# Patient Record
Sex: MALE | Race: OTHER | Employment: UNEMPLOYED | URBAN - METROPOLITAN AREA
[De-identification: names, ages, dates, MRNs, and addresses within clinical notes are randomized per-mention and may not be internally consistent; named-entity substitution may affect disease eponyms.]

---

## 2022-05-05 ENCOUNTER — APPOINTMENT (OUTPATIENT)
Dept: GENERAL RADIOLOGY | Age: 42
End: 2022-05-05
Attending: EMERGENCY MEDICINE

## 2022-05-05 ENCOUNTER — HOSPITAL ENCOUNTER (EMERGENCY)
Age: 42
Discharge: HOME | End: 2022-05-05
Attending: EMERGENCY MEDICINE

## 2022-05-05 VITALS
RESPIRATION RATE: 16 BRPM | BODY MASS INDEX: 23.78 KG/M2 | SYSTOLIC BLOOD PRESSURE: 122 MMHG | HEIGHT: 66 IN | HEART RATE: 57 BPM | DIASTOLIC BLOOD PRESSURE: 78 MMHG | TEMPERATURE: 97.5 F | OXYGEN SATURATION: 98 % | WEIGHT: 148 LBS

## 2022-05-05 DIAGNOSIS — M54.6 BILATERAL THORACIC BACK PAIN, UNSPECIFIED CHRONICITY: Primary | ICD-10-CM

## 2022-05-05 PROCEDURE — 74011250637 HC RX REV CODE- 250/637: Performed by: EMERGENCY MEDICINE

## 2022-05-05 PROCEDURE — 96372 THER/PROPH/DIAG INJ SC/IM: CPT

## 2022-05-05 PROCEDURE — 99284 EMERGENCY DEPT VISIT MOD MDM: CPT

## 2022-05-05 PROCEDURE — 71046 X-RAY EXAM CHEST 2 VIEWS: CPT

## 2022-05-05 PROCEDURE — 74011250636 HC RX REV CODE- 250/636: Performed by: EMERGENCY MEDICINE

## 2022-05-05 RX ORDER — KETOROLAC TROMETHAMINE 30 MG/ML
30 INJECTION, SOLUTION INTRAMUSCULAR; INTRAVENOUS
Status: COMPLETED | OUTPATIENT
Start: 2022-05-05 | End: 2022-05-05

## 2022-05-05 RX ORDER — IBUPROFEN 600 MG/1
600 TABLET ORAL
Qty: 20 TABLET | Refills: 0 | Status: SHIPPED | OUTPATIENT
Start: 2022-05-05

## 2022-05-05 RX ORDER — CYCLOBENZAPRINE HCL 10 MG
10 TABLET ORAL
Status: COMPLETED | OUTPATIENT
Start: 2022-05-05 | End: 2022-05-05

## 2022-05-05 RX ORDER — CYCLOBENZAPRINE HCL 10 MG
10 TABLET ORAL
Qty: 12 TABLET | Refills: 0 | Status: SHIPPED | OUTPATIENT
Start: 2022-05-05 | End: 2022-05-20

## 2022-05-05 RX ADMIN — CYCLOBENZAPRINE HYDROCHLORIDE 10 MG: 10 TABLET, FILM COATED ORAL at 14:07

## 2022-05-05 RX ADMIN — KETOROLAC TROMETHAMINE 30 MG: 30 INJECTION, SOLUTION INTRAMUSCULAR; INTRAVENOUS at 14:07

## 2022-05-05 NOTE — Clinical Note
[facility] [place] EMERGENCY DEPT  [address] [id number] [phone]    Work/School Note    Date: 2022    To Whom It May concern:      Felicia Prater was seen and treated today in the emergency room by the following provider(s):  Attending Provider: Kornad Dumont MD.      Felicia Prater is excused from work/school on 22. He is clear to return to work/school on 22.         Sincerely,          Sue Apgar, MD

## 2022-05-05 NOTE — ED TRIAGE NOTES
Pt reports mid back pain. Pt denies injury or trauma. Pt reports taking tylenol w/o relief. Pt also complaining of right foot pain. Pt denies injury or trauma.

## 2022-05-05 NOTE — ED NOTES
Discharge instructions were given to the patient by Lisseth Castrejon RN. The patient left the Emergency Department ambulatory, alert and oriented and in no acute distress with 2 prescriptions. The patient was encouraged to call or return to the ED for worsening issues or problems and was encouraged to schedule a follow up appointment for continuing care. The patient verbalized understanding of discharge instructions and prescriptions, all questions were answered. The patient has no further concerns at this time.

## 2022-05-05 NOTE — Clinical Note
[facility] EMERGENCY DEPT  [address] [id number] [phone]    Work/School Note    Date: 5/5/2022    To Whom It May concern:      Sangeeta De Dios was seen and treated today in the emergency room by the following provider(s):  Attending Provider: Roseline Dyson MD.      Sangeeta De Dios is excused from work/school on 05/05/22. He is clear to return to work/school on 05/06/22.         Sincerely,          Julia De La Cruz MD

## 2022-05-05 NOTE — ED PROVIDER NOTES
EMERGENCY DEPARTMENT HISTORY AND PHYSICAL EXAM      Date: 5/5/2022  Patient Name: Iván King    History of Presenting Illness     Chief Complaint   Patient presents with    Back Pain       History Provided By: Patient    HPI: Iván King, 40 y.o. male with no pertinent past medical history presents the emergency department with chief complaint of mid back pain. Patient states he was driving when he had sudden onset of bilateral mid back pain. He describes it as a dull aching pain that is worse with movement and bending. Pain otherwise is absent when he is remaining still. Pain is nonradiating. Pain is mild to moderate in intensity. Denies any lower extremity numbness/weakness, trauma, bowel/bladder dysfunction, chest pain or abdominal pain. Pt denies any other alleviating or exacerbating factors. Additionally, pt specifically denies any recent fever, chills, headache, nausea, vomiting, abdominal pain, CP, SOB, lightheadedness, dizziness, numbness, weakness, BLE swelling, heart palpitations, urinary sxs, diarrhea, constipation, melena, hematochezia, cough, or congestion. PCP: None    Current Outpatient Medications   Medication Sig Dispense Refill    cyclobenzaprine (FLEXERIL) 10 mg tablet Take 1 Tablet by mouth three (3) times daily as needed for Muscle Spasm(s) for up to 15 days. 12 Tablet 0    ibuprofen (MOTRIN) 600 mg tablet Take 1 Tablet by mouth every six (6) hours as needed for Pain. 20 Tablet 0       Past History     Past Medical History:  History reviewed. No pertinent past medical history. Past Surgical History:  History reviewed. No pertinent surgical history. Family History:  History reviewed. No pertinent family history. Social History:  Social History     Tobacco Use    Smoking status: Former Smoker    Smokeless tobacco: Never Used   Substance Use Topics    Alcohol use: Yes     Comment:  Occ    Drug use: Never       Allergies:  No Known Allergies      Review of Systems   Review of Systems  Constitutional: Negative for fever, chills, and fatigue. HENT: Negative for congestion, sore throat, rhinorrhea, sneezing and neck stiffness   Eyes: Negative for discharge and redness. Respiratory: Negative for  shortness of breath, wheezing   Cardiovascular: Negative for chest pain, palpitations   Gastrointestinal: Negative for nausea, vomiting, abdominal pain, constipation, diarrhea and blood in stool. Genitourinary: Negative for dysuria, hematuria, flank pain, decreased urine volume, discharge,   Musculoskeletal: Negative for myalgias or joint pain . Skin: Negative for rash or lesions . Neurological: Negative weakness, light-headedness, numbness and headaches. Physical Exam   Physical Exam    GENERAL: alert and oriented, no acute distress  EYES: PEERL, No injection, discharge or icterus. ENT: Mucous membranes pink and moist.  NECK: Supple  LUNGS: Airway patent. Non-labored respirations. Breath sounds clear with good air entry bilaterally. HEART: Regular rate and rhythm. No peripheral edema  ABDOMEN: Non-distended and non-tender, without guarding or rebound. SKIN:  warm, dry  MSK/EXTREMITIES: Without swelling, tenderness or deformity, symmetric with normal ROM. He has some mild bilateral parathoracic muscle tenderness, no midline lumbar or thoracic spinal tenderness  NEUROLOGICAL: Alert, oriented. Strength 5/5 bilateral lower extremities, sensation intact and equal throughout to light touch. Diagnostic Study Results     Labs -   No results found for this or any previous visit (from the past 12 hour(s)). Radiologic Studies -   XR CHEST PA LAT   Final Result   Normal chest.        CT Results  (Last 48 hours)    None        CXR Results  (Last 48 hours)               05/05/22 1415  XR CHEST PA LAT Final result    Impression:  Normal chest.       Narrative:  EXAM: XR CHEST PA LAT       INDICATION: upper back pain       COMPARISON: None.        FINDINGS: PA and lateral radiographs of the chest demonstrate clear lungs. The   cardiac and mediastinal contours and pulmonary vascularity are normal. The bones   and soft tissues are within normal limits. Medical Decision Making     ILaquita MD am the first provider for this patient and am the attending of record for this patient encounter. I reviewed the vital signs, available nursing notes, past medical history, past surgical history, family history and social history. Vital Signs-Reviewed the patient's vital signs. Patient Vitals for the past 12 hrs:   Temp Pulse Resp BP SpO2   05/05/22 1258 97.5 °F (36.4 °C) (!) 57 16 122/78 98 %           Records Reviewed: Nursing Notes and Old Medical Records    Provider Notes (Medical Decision Making): On presentation the patient is a well appearing, in no acute distress with normal vital signs. No urine/bowel incontinence or perianal numbess to suggest cauda equina. No midline TTP, fever/chills, IVDA to suggest epidural abscess or discitis. No focal weakness or sensory changes to suggest transverse myelitis. Therefore emergent MRI not indicated. No risk of compression fracture or trauma to warrant x-ray or CT imaging. Patient is having no urinary symptoms additionally pain seems to be incited by movement. Chest x-ray obtained and showed no acute findings on my interpretation. Patient was given Toradol and Flexeril with improvement in symptoms. Patient was able to ambulate without difficulty. I have recommended rest, avoiding heavy lifting until better, use of intermittent heat (avoid sleeping on a heating pad), and use of OTC NSAID's (Advil, Alleve etc) or Tylenol prn for pain. I explained the safe use of NSAIDS and the possible cardiovascular,GI and renal risks involved. The patient was discharged in stable condition with return precautions given and instructions to follow up with their primary care physician.        ED Course:   Initial assessment performed. The patients presenting problems have been discussed, and they are in agreement with the care plan formulated and outlined with them. I have encouraged them to ask questions as they arise throughout their visit. Medications   ketorolac (TORADOL) injection 30 mg (30 mg IntraMUSCular Given 5/5/22 1407)   cyclobenzaprine (FLEXERIL) tablet 10 mg (10 mg Oral Given 5/5/22 1407)         PROGRESS  Geradro Adams's  results have been reviewed with him. He has been counseled regarding his diagnosis. He verbally conveys understanding and agreement of the signs, symptoms, diagnosis, treatment and prognosis and additionally agrees to follow up as recommended with Dr. None in 24 - 48 hours. He also agrees with the care-plan and conveys that all of his questions have been answered. I have also put together some discharge instructions for him that include: 1) educational information regarding their diagnosis, 2) how to care for their diagnosis at home, as well a 3) list of reasons why they would want to return to the ED prior to their follow-up appointment, should their condition change. Disposition:  home    PLAN:  1. Discharge Medication List as of 5/5/2022  2:47 PM      START taking these medications    Details   cyclobenzaprine (FLEXERIL) 10 mg tablet Take 1 Tablet by mouth three (3) times daily as needed for Muscle Spasm(s) for up to 15 days. , Normal, Disp-12 Tablet, R-0      ibuprofen (MOTRIN) 600 mg tablet Take 1 Tablet by mouth every six (6) hours as needed for Pain., Normal, Disp-20 Tablet, R-0           2. Follow-up Information     Follow up With Specialties Details Why [facility]    [facility] [place] EMERGENCY DEPT Emergency Medicine  If symptoms worsen Sherine Bermudez        Return to ED if worse     Diagnosis     Clinical Impression:   1.  Bilateral thoracic back pain, unspecified chronicity        Please note that this dictation was completed with Dragon, computer voice recognition software. Quite often unanticipated grammatical, syntax, homophones, and other interpretive errors are inadvertently transcribed by the computer software. Please disregard these errors. Additionally, please excuse any errors that have escaped final proofreading.

## 2022-05-05 NOTE — ED NOTES
